# Patient Record
Sex: FEMALE | Race: WHITE | NOT HISPANIC OR LATINO | URBAN - METROPOLITAN AREA
[De-identification: names, ages, dates, MRNs, and addresses within clinical notes are randomized per-mention and may not be internally consistent; named-entity substitution may affect disease eponyms.]

---

## 2017-07-10 ENCOUNTER — OUTPATIENT (OUTPATIENT)
Dept: OUTPATIENT SERVICES | Facility: HOSPITAL | Age: 57
LOS: 1 days | End: 2017-07-10
Payer: COMMERCIAL

## 2017-07-10 PROCEDURE — 76942 ECHO GUIDE FOR BIOPSY: CPT | Mod: 26,LT

## 2017-07-10 PROCEDURE — 10160 PNXR ASPIR ABSC HMTMA BULLA: CPT

## 2023-12-21 ENCOUNTER — APPOINTMENT (OUTPATIENT)
Dept: OBGYN | Facility: CLINIC | Age: 63
End: 2023-12-21
Payer: MEDICAID

## 2023-12-21 VITALS
DIASTOLIC BLOOD PRESSURE: 78 MMHG | HEIGHT: 62 IN | WEIGHT: 162 LBS | BODY MASS INDEX: 29.81 KG/M2 | SYSTOLIC BLOOD PRESSURE: 117 MMHG

## 2023-12-21 DIAGNOSIS — N81.10 CYSTOCELE, UNSPECIFIED: ICD-10-CM

## 2023-12-21 DIAGNOSIS — N81.11 CYSTOCELE, MIDLINE: ICD-10-CM

## 2023-12-21 PROBLEM — Z00.00 ENCOUNTER FOR PREVENTIVE HEALTH EXAMINATION: Status: ACTIVE | Noted: 2023-12-21

## 2023-12-21 PROCEDURE — 99203 OFFICE O/P NEW LOW 30 MIN: CPT

## 2023-12-21 NOTE — PLAN
[FreeTextEntry1] : Jonn's exercises  follow up if she opts for Pessary   pessary was discussed in detail  Information for Uro Gyn given to patient and referral placed in system patient reports she will see over the next month how jonn's works for her first  Er warnings given

## 2023-12-21 NOTE — HISTORY OF PRESENT ILLNESS
[FreeTextEntry1] : 64 yo in for evaluation of bladder prolapse   Reports voiding on Monday and sudden vaginal bulge noted   She pushed it back in Denies fever/chills, nausea/emesis, dyspnea, dysuria, vaginal bleeding, or vaginal discharge Denies pelvic pain  Patient recently completed chemotherapy for her breast cancer with bone and liver involvement  Denies current complaints  Patient educated on Kegel's exercises   Examination confirms cystocele   no ulcerations noted  No signs of infection

## 2023-12-21 NOTE — PHYSICAL EXAM
[Chaperone Declined] : Patient declined chaperone [FreeTextEntry1] : family member present [Appropriately responsive] : appropriately responsive [Alert] : alert [No Acute Distress] : no acute distress [Oriented x3] : oriented x3 [No Lesions] : no lesions  [Labia Majora] : normal [Labia Minora] : normal [Cystocele] : a cystocele [Dry Mucosa] : dry mucosa [No Bleeding] : There was no active vaginal bleeding [Normal] : normal [Normal Position] : in a normal position [Tenderness] : nontender [Enlarged ___ wks] : not enlarged [Mass ___ cm] : no uterine mass was palpated [Uterine Adnexae] : normal [Declined] : Patient declined rectal exam